# Patient Record
Sex: FEMALE | Race: WHITE | Employment: UNEMPLOYED | URBAN - METROPOLITAN AREA
[De-identification: names, ages, dates, MRNs, and addresses within clinical notes are randomized per-mention and may not be internally consistent; named-entity substitution may affect disease eponyms.]

---

## 2022-09-10 ENCOUNTER — HOSPITAL ENCOUNTER (EMERGENCY)
Age: 52
Discharge: HOME | End: 2022-09-10
Attending: EMERGENCY MEDICINE
Payer: COMMERCIAL

## 2022-09-10 ENCOUNTER — APPOINTMENT (OUTPATIENT)
Dept: CT IMAGING | Age: 52
End: 2022-09-10
Payer: COMMERCIAL

## 2022-09-10 VITALS
OXYGEN SATURATION: 98 % | DIASTOLIC BLOOD PRESSURE: 84 MMHG | TEMPERATURE: 98.8 F | HEART RATE: 84 BPM | RESPIRATION RATE: 18 BRPM | SYSTOLIC BLOOD PRESSURE: 122 MMHG | WEIGHT: 132.28 LBS | BODY MASS INDEX: 20.72 KG/M2

## 2022-09-10 DIAGNOSIS — S01.312A EAR LOBE LACERATION, LEFT, INITIAL ENCOUNTER: ICD-10-CM

## 2022-09-10 DIAGNOSIS — Z23 TETANUS TOXOID INOCULATION: ICD-10-CM
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
## 2022-09-10 DIAGNOSIS — S09.90XA CLOSED HEAD INJURY, INITIAL ENCOUNTER: ICD-10-CM

## 2022-09-10 DIAGNOSIS — T14.8XXA SUTURE OF SKIN WOUND: ICD-10-CM

## 2022-09-10 PROCEDURE — 90471 IMMUNIZATION ADMIN: CPT | Performed by: NURSE PRACTITIONER

## 2022-09-10 PROCEDURE — 99284 EMERGENCY DEPT VISIT MOD MDM: CPT

## 2022-09-10 PROCEDURE — 6360000002 HC RX W HCPCS: Performed by: NURSE PRACTITIONER

## 2022-09-10 PROCEDURE — 6370000000 HC RX 637 (ALT 250 FOR IP): Performed by: NURSE PRACTITIONER

## 2022-09-10 PROCEDURE — 12011 RPR F/E/E/N/L/M 2.5 CM/<: CPT

## 2022-09-10 PROCEDURE — 70450 CT HEAD/BRAIN W/O DYE: CPT

## 2022-09-10 PROCEDURE — 90715 TDAP VACCINE 7 YRS/> IM: CPT | Performed by: NURSE PRACTITIONER

## 2022-09-10 RX ORDER — IBUPROFEN 800 MG/1
800 TABLET ORAL EVERY 8 HOURS PRN
Qty: 20 TABLET | Refills: 0 | Status: SHIPPED | OUTPATIENT
Start: 2022-09-10

## 2022-09-10 RX ORDER — ACETAMINOPHEN 500 MG
1000 TABLET ORAL 3 TIMES DAILY PRN
Qty: 180 TABLET | Refills: 0 | Status: SHIPPED | OUTPATIENT
Start: 2022-09-10

## 2022-09-10 RX ORDER — HYDROCODONE BITARTRATE AND ACETAMINOPHEN 5; 325 MG/1; MG/1
1 TABLET ORAL EVERY 6 HOURS PRN
Qty: 6 TABLET | Refills: 0 | Status: SHIPPED | OUTPATIENT
Start: 2022-09-10 | End: 2022-09-13

## 2022-09-10 RX ORDER — AMOXICILLIN AND CLAVULANATE POTASSIUM 875; 125 MG/1; MG/1
1 TABLET, FILM COATED ORAL 2 TIMES DAILY
Qty: 20 TABLET | Refills: 0 | Status: SHIPPED | OUTPATIENT
Start: 2022-09-10 | End: 2022-09-20

## 2022-09-10 RX ORDER — BACITRACIN ZINC AND POLYMYXIN B SULFATE 500; 1000 [USP'U]/G; [USP'U]/G
OINTMENT TOPICAL 2 TIMES DAILY
Qty: 1 EACH | Refills: 0 | Status: SHIPPED | OUTPATIENT
Start: 2022-09-10 | End: 2022-09-20

## 2022-09-10 RX ORDER — AMOXICILLIN AND CLAVULANATE POTASSIUM 875; 125 MG/1; MG/1
1 TABLET, FILM COATED ORAL ONCE
Status: COMPLETED | OUTPATIENT
Start: 2022-09-10 | End: 2022-09-10

## 2022-09-10 RX ADMIN — AMOXICILLIN AND CLAVULANATE POTASSIUM 1 TABLET: 875; 125 TABLET, FILM COATED ORAL at 21:20

## 2022-09-10 RX ADMIN — TETANUS TOXOID, REDUCED DIPHTHERIA TOXOID AND ACELLULAR PERTUSSIS VACCINE, ADSORBED 0.5 ML: 5; 2.5; 8; 8; 2.5 SUSPENSION INTRAMUSCULAR at 19:31

## 2022-09-10 ASSESSMENT — PAIN SCALES - GENERAL: PAINLEVEL_OUTOF10: 4

## 2022-09-10 NOTE — ED PROVIDER NOTES
[address]  [address] [address]  Dept: [phone]  Loc: [address] ENCOUNTER        This patient seen and evaluated per myself in conjunction with ED attending Dr. Shanel Huff    Chief Complaint   Patient presents with    Animal Bite     Pt was walking down the street, great Jn \"attacked\" her and left ear is split in half at the top         HPI   Earl Suresh is a 45 y.o. female who presents with a skin injury after a dog bite. The onset was just prior to arrival.  The location of the bite is the left ear. Associated bleeding was alleviated with pressure. The context was that patient was going for a walk in the evening, dog which lives in the neighborhood, she got the owners information, came up and she states \"attacked me. \"  She did hit her head on the pavement but does not think that she lost consciousness. No post injury nausea or vomiting. Is not on any anticoagulants. Cannot remember falling. Denies any numbness or tingling in any extremities, no diplopia or visual changes. Does not know when her last tetanus was, came to the emergency department for further evaluation and treat.      REVIEW OF SYSTEMS   Skin: see HPI  Musculoskeletal: No bony deformity or joint swelling  General: No fevers or syncope   Immunization: Tetanus status unknown, will be updated in the ED    PAST MEDICAL & SURGICAL HISTORY   Past Medical History:   Diagnosis Date    Thyroid disease      Past Surgical History:   Procedure Laterality Date    APPENDECTOMY      FRACTURE SURGERY          CURRENT MEDICATIONS (may include discharge medications prescribed in the ED)  Current Outpatient Rx   Medication Sig Dispense Refill    amoxicillin-clavulanate (AUGMENTIN) 875-125 MG per tablet Take 1 tablet by mouth 2 times daily for 10 days 20 tablet 0    HYDROcodone-acetaminophen (NORCO) 5-325 MG per tablet Take 1 slurred speech, sensory and motor intact                    RADIOLOGY  CT Head W/O Contrast   Final Result   1. No acute intracranial abnormality. 2. Scattered paranasal sinus disease. PROCEDURE  Laceration Repair Procedure Note  Performed by: myself  Consent:  Verbal consent obtained by patient. Risk of infection and pain discussed, as well as alternatives. Anesthesia:  The patient was placed in the appropriate position and anesthesia obtained by auricular block using 0.5% bupivacaine without epinephrine   repair type:  intermediate - wound was contaminated and required extensive cleansing  Pre-procedure:  Patient was prepped and draped in usual sterile fashion   Laceration details:    Location: left ear    Length (cm):  2.5  Preparation:  Patient was prepped and draped in usual sterile fashion   Exploration: Hemostasis achieved with direct pressure, entire depth of wound probed and visualized    Contaminated: no    Treatment:   Amount of cleaning:  Extensive     Irrigation solution:  High pressure sterile water  Visualized foreign bodies/material removed: no    Skin repair:   Repair method:  Sutures  Suture size:  6-0  Suture material:  Nylon  Suture technique:  Simple interrupted  Approximation: The wound edges were approximated closely due to the visually debilitating aspect of the laceration if this does not heal appropriately  Number of sutures: 10  Dressing:  Sterile dressing and antibiotic ointment  Patient tolerance of procedure: Tolerated well, no immediate complications      ED COURSE & MEDICAL DECISION MAKING   See chart for details of medications ordered. We discussed the issue of likelihood of rabies in the offending animal and risk/benefits of the rabies vaccination. I explained to the patient that there have been no incidences of rabies from dog bites in [place], and that vaccination is currently not recommended by the Health Department.      Differential diagnosis: Cellulitis, abscess, foreign body retention (tooth), tetanus, rabies, deep space infection, osteomyelitis, septic joint, other     Patient is afebrile and nontoxic in appearance. CT is read by the radiologist as above. I'll prescribe Augmentin for prophylaxis and recommend close outpatient follow-up. She is to return immediately for any new or worsening symptoms. Verbalized understanding. Has no further questions or concerns and will be discharged home in stable condition. No results found for this visit on 09/10/22. I estimate there is LOW risk for SUBARACHNOID HEMORRHAGE, MENINGITIS, INTRACRANIAL HEMORRHAGE, SUBDURAL HEMATOMA, OR STROKE, thus I consider the discharge disposition reasonable. Jitendra Brown and I have discussed the diagnosis and risks, and we agree with discharging home to follow-up with their primary doctor. We also discussed returning to the Emergency Department immediately if new or worsening symptoms occur. We have discussed the symptoms which are most concerning (e.g., changing or worsening pain, weakness, vomiting, fever) that necessitate immediate return. Discharge Vital Signs:  Blood pressure 122/84, pulse 84, temperature 98.8 °F (37.1 °C), temperature source Oral, resp. rate 18, weight 132 lb 4.4 oz (60 kg), SpO2 98 %. I instructed the patient to follow up as an outpatient in 2 days with primary care for wound recheck, and to follow up with primary care or return to the ED in 5-7 days for suture removal.  I instructed the patient to return to the ED immediately for any new or worsening symptoms. The patient verbalizes understanding. FINAL IMPRESSION   1. Dog bite, initial encounter    2. Ear lobe laceration, left, initial encounter    3. Suture of skin wound    4. Tetanus toxoid inoculation    5.  Closed head injury, initial encounter        PLAN  Discharge with outpatient follow-up, discharge instructions, antibiotics (see EMR)    (Please note that this note was completed with a voice recognition program.  Every attempt was made to edit the dictations, but inevitably there remain words that are mis-transcribed.)         SHEYLA Bishop - CNP  09/10/22 [id number]

## 2022-09-11 NOTE — ED PROVIDER NOTES
Attending Supervisory Note/Shared Visit   I have personally performed a face to face diagnostic evaluation on this patient. I have reviewed the mid-levels findings and agree. History and Exam by me shows alert white female in no acute distress. Patient was walking down the street in a great Jn attacked her and bit her in her left ear. It split the top of her ear. The location of the dog is known. She can check on his immunization status. HEENT: Atraumatic. Pupils equal round reactive. Extraocular movements are intact. Nose is clear. Oropharynx is negative. There is an irregular laceration that is through and through on the superior part of the helix of the left ear. It extends down to centimeters on the back of the ear as pictured below. Extends 2 cm on the front part of the ear as well. It is through and through involving the cartilage. Neck: Supple, nontender, no adenopathy. Heart: Regular rate and rhythm. No murmurs or gallops noted. Lungs: Breath sounds equal bilaterally and clear. Neuro: Awake, alert, oriented. Symmetrical reactive pupils. Intact extraocular movements. No facial asymmetry. Symmetrical motor function. Normal gait. Patient laceration was repaired. She was placed on Augmentin. Her tetanus was updated. She will follow-up for wound recheck and suture removal.  We discussed the risk of infection. She understands if she develops any signs of infection she should return immediately.       (Please note that portions of this note were completed with a voice recognition program.  Efforts were made to edit the dictations but occasionally words are mis-transcribed.)    Kasandra Bynum MD  Attending Emergency Physician        Maryana Amanda MD  09/10/22 [id number]

## 2022-09-11 NOTE — ED NOTES
D/C: Order noted for d/c. Pt confirmed d/c paperwork  have correct name. Discharge and education instructions reviewed with patient. Teach-back successful. Pt verbalized understanding and signed d/c papers. Pt denied questions at this time. No acute distress noted. Patient instructed to follow-up as noted - return to emergency department if symptoms worsen. Patient verbalized understanding. Discharged per EDMD with discharge instructions. Pt discharged  to private vehicle. Patient stable upon departure. Thanked patient for choosing [facility]) for care.          Oneida Perdomo RN  09/10/22 [id number]